# Patient Record
Sex: MALE | Race: WHITE | URBAN - METROPOLITAN AREA
[De-identification: names, ages, dates, MRNs, and addresses within clinical notes are randomized per-mention and may not be internally consistent; named-entity substitution may affect disease eponyms.]

---

## 2023-11-02 ENCOUNTER — OFFICE VISIT (OUTPATIENT)
Age: 23
End: 2023-11-02

## 2023-11-02 VITALS
OXYGEN SATURATION: 96 % | WEIGHT: 206.2 LBS | DIASTOLIC BLOOD PRESSURE: 75 MMHG | SYSTOLIC BLOOD PRESSURE: 110 MMHG | BODY MASS INDEX: 36.54 KG/M2 | HEART RATE: 68 BPM | TEMPERATURE: 97.5 F | HEIGHT: 63 IN

## 2023-11-02 DIAGNOSIS — J06.9 UPPER RESPIRATORY TRACT INFECTION, UNSPECIFIED TYPE: ICD-10-CM

## 2023-11-02 DIAGNOSIS — U07.1 COVID: Primary | ICD-10-CM

## 2023-11-02 LAB
Lab: ABNORMAL
QC PASS/FAIL: ABNORMAL
SARS-COV-2 RDRP RESP QL NAA+PROBE: POSITIVE

## 2023-11-02 NOTE — PROGRESS NOTES
Christina Peña (:  2000) is a 20 y.o. male,New patient, here for evaluation of the following chief complaint(s):  Covid Testing (Patient here for covid test. He has a sore throat and sinus issues starting last night. Patient took at home covid test last night, positive results. )      ASSESSMENT/PLAN:    ICD-10-CM    1. COVID  U07.1       2. Upper respiratory tract infection, unspecified type  J06.9 POCT COVID-19 Rapid, NAAT         Contains abnormal data POCT COVID-19 Rapid, NAAT  Order: [id number]  Status: Final result     Visible to patient: No (not released)     Next appt: None     Dx: Upper respiratory tract infection, un...     0 Result Notes       Component Ref Range & Units 23 1339   SARS-COV-2, RdRp gene Negative Positive Abnormal     Lot Number  [id number]    QC Pass/Fail  PASS               Specimen Collected: 23 13:39 EDT Last Resulted: 23 13:40 EDT           Patient declined Paxlovid    AVS reviewed / questions answered    Keep hydrated, tylenol or ibuprofen (if no contraindications) as needed if pain or fever. .  follow up in  7- days if not better. Symptomatic therapy suggested: gargle for sore throat, use mist at bedside for congestion. .  May use  Cough/cold medication if patient starts coughing and feels need to take medication   go to the ER if symptoms worse/feeling worse or has new symptoms or concerns    Discussed risk-side effects vs benefits of taking Paxlovid  Advised 5 days of isolation and then if feeling better and without fever, can leave isolation but is to continue wearing mask for additional 5 days      SUBJECTIVE/OBJECTIVE:  Patient presents with:  Covid Testing: Patient here for covid test. He has a sore throat and sinus issues starting last night. Patient took at home covid test last night, positive results.           History provided by:  Patient   used: No        Vitals:    23 1313   BP: 110/75   Site: Left Upper Arm   Position:

## 2023-11-02 NOTE — PATIENT INSTRUCTIONS
Keep hydrated, tylenol or ibuprofen (if no contraindications) as needed if pain or fever. .  follow up in  7- days if not better. Symptomatic therapy suggested: gargle for sore throat, use mist at bedside for congestion.   .  May use  Cough/cold medication if patient starts coughing and feels need to take medication   go to the ER if symptoms worse/feeling worse or has new symptoms or concerns    Discussed risk-side effects vs benefits of taking Paxlovid  Advised 5 days of isolation and then if feeling better and without fever, can leave isolation but is to continue wearing mask for additional 5 days

## 2023-11-03 ASSESSMENT — ENCOUNTER SYMPTOMS
NAUSEA: 0
SHORTNESS OF BREATH: 0
COUGH: 0
EYES NEGATIVE: 1
VOMITING: 0
TROUBLE SWALLOWING: 0
DIARRHEA: 0